# Patient Record
Sex: MALE | Race: WHITE | NOT HISPANIC OR LATINO | Employment: FULL TIME | URBAN - METROPOLITAN AREA
[De-identification: names, ages, dates, MRNs, and addresses within clinical notes are randomized per-mention and may not be internally consistent; named-entity substitution may affect disease eponyms.]

---

## 2023-01-27 ENCOUNTER — OFFICE VISIT (OUTPATIENT)
Dept: NEUROSURGERY | Facility: CLINIC | Age: 58
End: 2023-01-27
Payer: COMMERCIAL

## 2023-01-27 VITALS
HEIGHT: 69 IN | WEIGHT: 185 LBS | TEMPERATURE: 97.8 F | BODY MASS INDEX: 27.4 KG/M2 | DIASTOLIC BLOOD PRESSURE: 88 MMHG | SYSTOLIC BLOOD PRESSURE: 132 MMHG

## 2023-01-27 DIAGNOSIS — M25.551 RIGHT HIP PAIN: Primary | ICD-10-CM

## 2023-01-27 PROCEDURE — 99204 OFFICE O/P NEW MOD 45 MIN: CPT

## 2023-01-27 RX ORDER — SIMVASTATIN 80 MG
80 TABLET ORAL NIGHTLY
COMMUNITY

## 2023-01-27 RX ORDER — MOMETASONE FUROATE AND FORMOTEROL FUMARATE DIHYDRATE 200; 5 UG/1; UG/1
AEROSOL RESPIRATORY (INHALATION)
COMMUNITY
Start: 2020-01-01

## 2023-01-27 RX ORDER — CETIRIZINE HYDROCHLORIDE 10 MG/1
10 TABLET ORAL DAILY
COMMUNITY

## 2023-01-27 RX ORDER — MONTELUKAST SODIUM 10 MG/1
10 TABLET ORAL NIGHTLY
COMMUNITY

## 2023-01-27 NOTE — PROGRESS NOTES
Subjective   Patient: Mendel Myrick   Age, Date of Birth: 57 y.o., 1965  Sex: male    Primary Care Provider: Maria Teresa Lee APRN    Chief Complaint: Right hip pain     History of Present Illness:  Patient is a 57-year-old male who went on an extensive hiking trip through [place] last year and has been afflicted by right hip pain ever since.  He reports a decreased range of motion in his right hip that is worse when he first stands up but improves with movement.  He denies back pain or a pain that radiates down his leg.  Patient went to a few sessions of physical therapy for his hip, he does those exercises at home now.    He denies any difficulty with walking long distances.  Patient denies urinary or bowel incontinence, saddle anesthesia.    Current Outpatient Medications   Medication Sig Dispense Refill   • cetirizine (zyrTEC) 10 MG tablet Take 10 mg by mouth Daily.     • mometasone-formoterol (Dulera) 200-5 MCG/ACT inhaler      • montelukast (SINGULAIR) 10 MG tablet Take 10 mg by mouth Every Night.     • simvastatin (Zocor) 80 MG tablet Take 80 mg by mouth Every Night.       No current facility-administered medications for this visit.       Past Medical History:   Diagnosis Date   • Arthritis 11/30/22   • Asthma 2017   • Hyperlipidemia 2004       Review of Systems   Constitutional: Negative for activity change, appetite change, chills, diaphoresis, fatigue, fever and unexpected weight change.   HENT: Negative for congestion, dental problem, drooling, ear discharge, ear pain, facial swelling, hearing loss, mouth sores, nosebleeds, postnasal drip, rhinorrhea, sinus pressure, sneezing, sore throat, tinnitus, trouble swallowing and voice change.    Eyes: Negative for photophobia, pain, discharge, redness, itching and visual disturbance.   Respiratory: Negative for apnea, cough, choking, chest tightness, shortness of breath, wheezing and stridor.    Cardiovascular: Negative for chest pain, palpitations and leg  "swelling.   Gastrointestinal: Negative for abdominal distention, abdominal pain, anal bleeding, blood in stool, constipation, diarrhea, nausea, rectal pain and vomiting.   Endocrine: Negative for cold intolerance, heat intolerance, polydipsia, polyphagia and polyuria.   Genitourinary: Negative for decreased urine volume, difficulty urinating, dysuria, enuresis, flank pain, frequency, genital sores, hematuria and urgency.   Musculoskeletal: Positive for arthralgias. Negative for back pain, gait problem, joint swelling, myalgias, neck pain and neck stiffness.   Skin: Negative for color change, pallor, rash and wound.   Allergic/Immunologic: Negative for environmental allergies, food allergies and immunocompromised state.   Neurological: Negative for dizziness, tremors, seizures, syncope, facial asymmetry, speech difficulty, weakness, light-headedness, numbness and headaches.   Hematological: Negative for adenopathy. Does not bruise/bleed easily.   Psychiatric/Behavioral: Negative for agitation, behavioral problems, confusion, decreased concentration, dysphoric mood, hallucinations, self-injury, sleep disturbance and suicidal ideas. The patient is not nervous/anxious and is not hyperactive.    All other systems reviewed and are negative.      Objective   Vitals:    01/27/23 0954   BP: 132/88   BP Location: Right arm   Patient Position: Sitting   Cuff Size: Adult   Temp: 97.8 °F (36.6 °C)   TempSrc: Infrared   Weight: 83.9 kg (185 lb)   Height: 175.3 cm (69\")        Physical Exam:    Physical Exam  Vitals and nursing note reviewed.   Constitutional:       Appearance: Normal appearance.   HENT:      Head: Normocephalic and atraumatic.   Musculoskeletal:      Right lower leg: No edema.      Left lower leg: No edema.      Comments: Patient had 5 out of 5 strength with hip flexion, plantar and dorsiflexion bilaterally.    Patient's hips were nontender to palpation bilaterally.  I really do not appreciate much decreased " range of motion of the patient's right hip.   Neurological:      Mental Status: He is alert.      Cranial Nerves: No cranial nerve deficit or facial asymmetry.      Sensory: Sensation is intact. No sensory deficit.      Motor: Motor function is intact. No weakness, tremor, atrophy, abnormal muscle tone or seizure activity.      Coordination: Romberg sign negative.      Gait: Gait is intact. Gait and tandem walk normal.      Deep Tendon Reflexes:      Reflex Scores:       Patellar reflexes are 2+ on the right side and 2+ on the left side.       Achilles reflexes are 2+ on the right side and 2+ on the left side.     Comments: Straight leg raise was negative bilaterally.    Intact vibratory and temperature sensation bilaterally.  No signs of ankle clonus bilaterally.  Was able to ambulate on heels and tiptoes free of pain.   Psychiatric:         Mood and Affect: Mood normal.         Behavior: Behavior normal.         Thought Content: Thought content normal.         Judgment: Judgment normal.         Tobacco Use: Unknown   • Smoking Tobacco Use: Never   • Smokeless Tobacco Use: Unknown   • Passive Exposure: Not on file       BMI is >= 25 and <30. (Overweight) The following options were offered after discussion;: exercise counseling/recommendations      STEADI Fall Risk Assessment has not been completed.    Assessment & Plan     Data Review:  (All imaging is independently reviewed unless stated otherwise.)  MRI of the lumbar spine from 12/20/2022 shows a broad disc bulge at L4-L5 as well as L5-S1.  Neither results any significant central or neuroforaminal stenosis.  There is disc degeneration with associated disc height loss at L4-L5 and L5-S1.    Medical Decision Making:  Based off the patient's symptoms as well as imaging I am not convinced that he is suffering from a nerve root impingement.  He certainly does have some disc bulges that might become a problem in the future, but at the current interval I am not  concerned about them.  He was educated on the signs of cauda equina syndrome and encouraged to go to the ED if he develops any of those.  Encouraged him to cut back on dirt biking and to avoid lifting very heavy items as both of those could contribute to further disc herniation.   As such I am content to follow-up with the patient on an as-needed basis, I recommended he get a further evaluation on his hip.  His pain right now is well controlled, I encouraged him in physical therapy and conservative management of his condition.  He was educated on the signs of radiculopathy, myelopathy and encouraged to contact us if he develops any of those.  Patient encouraged to contact us if he has any changes in his condition or any concerns.     Diagnosis Plan   1. Right hip pain             Electronically signed and dated:    Marv Anaya PA-C on 15:08 EST 01/27/23